# Patient Record
Sex: FEMALE | Race: WHITE | NOT HISPANIC OR LATINO
[De-identification: names, ages, dates, MRNs, and addresses within clinical notes are randomized per-mention and may not be internally consistent; named-entity substitution may affect disease eponyms.]

---

## 2021-01-11 ENCOUNTER — RESULT REVIEW (OUTPATIENT)
Age: 36
End: 2021-01-11

## 2021-01-26 PROBLEM — Z00.00 ENCOUNTER FOR PREVENTIVE HEALTH EXAMINATION: Status: ACTIVE | Noted: 2021-01-26

## 2021-02-22 ENCOUNTER — RESULT REVIEW (OUTPATIENT)
Age: 36
End: 2021-02-22

## 2021-04-27 DIAGNOSIS — Z01.818 ENCOUNTER FOR OTHER PREPROCEDURAL EXAMINATION: ICD-10-CM

## 2021-04-28 ENCOUNTER — APPOINTMENT (OUTPATIENT)
Dept: DISASTER EMERGENCY | Facility: CLINIC | Age: 36
End: 2021-04-28

## 2021-04-29 LAB — SARS-COV-2 N GENE NPH QL NAA+PROBE: NOT DETECTED

## 2021-06-02 ENCOUNTER — APPOINTMENT (OUTPATIENT)
Dept: DISASTER EMERGENCY | Facility: CLINIC | Age: 36
End: 2021-06-02

## 2021-10-19 ENCOUNTER — TRANSCRIPTION ENCOUNTER (OUTPATIENT)
Age: 36
End: 2021-10-19

## 2022-04-25 ENCOUNTER — APPOINTMENT (OUTPATIENT)
Dept: PAIN MANAGEMENT | Facility: CLINIC | Age: 37
End: 2022-04-25
Payer: MEDICAID

## 2022-04-25 VITALS — HEIGHT: 61 IN | WEIGHT: 125 LBS | BODY MASS INDEX: 23.6 KG/M2

## 2022-04-25 DIAGNOSIS — Z87.09 PERSONAL HISTORY OF OTHER DISEASES OF THE RESPIRATORY SYSTEM: ICD-10-CM

## 2022-04-25 PROCEDURE — 99214 OFFICE O/P EST MOD 30 MIN: CPT

## 2022-04-25 NOTE — DISCUSSION/SUMMARY
[de-identified] : After discussing various treatment options with the patient including but not limited to oral medications, physical therapy, exercise modalities as well as interventional spinal injections, we have decided with the following plan:\par \par - Continue Home exercises, stretching, activity modification, physical therapy, and conservative care.\par - Recommend L5-S1 Lumbar Epidural Steroid Injection under fluoroscopic guidance with image.\par - The risks, benefits and alternatives of the proposed procedure were explained in detail with the patient. The risks outlined include but are not limited to infection, bleeding, post-dural puncture headache, nerve injury, a temporary increase in pain, failure to resolve symptoms, allergic reaction, symptom recurrence, and possible elevation of blood sugar in diabetics. All questions were answered to patient's apparent satisfaction and he/she verbalized an understanding.\par - Patient is presenting with acute/sub-acute radicular pain with impairment in ADLs and functionality.  The pain has not responded to conservative care including NSAID therapy and/or physical therapy.  There is no bleeding tendency, unstable medical condition, or systemic infection.\par - Follow up in 1-2 weeks post injection for re-evaluation.\par

## 2022-04-25 NOTE — HISTORY OF PRESENT ILLNESS
[Lower back] : lower back [10] : 10 [0] : 0 [Dull/Aching] : dull/aching [Tightness] : tightness [Constant] : constant [Household chores] : household chores [Work] : work [Injection therapy] : injection therapy [Standing] : standing [Walking] : walking [] : no [FreeTextEntry1] : left

## 2022-04-25 NOTE — PHYSICAL EXAM
[de-identified] : Constitutional; Appears well, no apparent distress\par Ability to communicate: Normal \par Respiratory: non-labored breathing\par Skin: No rash noted\par Head: Normocephalic, atraumatic\par Neck: no visible thyroid enlargement\par Eyes: Extraocular movements intact\par Neurologic: Alert and oriented x3\par Psychiatric: normal mood, affect and behavior \par \par  [Flexion] : flexion [] : non-antalgic

## 2022-05-20 ENCOUNTER — EMERGENCY (EMERGENCY)
Facility: HOSPITAL | Age: 37
LOS: 1 days | Discharge: HOME | End: 2022-05-20
Admitting: EMERGENCY MEDICINE
Payer: MEDICAID

## 2022-05-20 VITALS
HEART RATE: 83 BPM | SYSTOLIC BLOOD PRESSURE: 131 MMHG | OXYGEN SATURATION: 97 % | WEIGHT: 130.07 LBS | TEMPERATURE: 98 F | DIASTOLIC BLOOD PRESSURE: 77 MMHG | RESPIRATION RATE: 18 BRPM

## 2022-05-20 PROCEDURE — 99284 EMERGENCY DEPT VISIT MOD MDM: CPT

## 2022-05-20 NOTE — ED ADULT NURSE NOTE - OBJECTIVE STATEMENT
Patient presents with alcohol intoxication. pt stated she had 3 beer, don't usually drink. Alert and oriented x 3, answering questions appropriately, calm and cooperative

## 2022-05-20 NOTE — ED ADULT NURSE NOTE - NSIMPLEMENTINTERV_GEN_ALL_ED
Implemented All Fall Risk Interventions:  [place] to call system. Call bell, personal items and telephone within reach. Instruct patient to call for assistance. Room bathroom lighting operational. Non-slip footwear when patient is off stretcher. Physically safe environment: no spills, clutter or unnecessary equipment. Stretcher in lowest position, wheels locked, appropriate side rails in place. Provide visual cue, wrist band, yellow gown, etc. Monitor gait and stability. Monitor for mental status changes and reorient to person, place, and time. Review medications for side effects contributing to fall risk. Reinforce activity limits and safety measures with patient and family.

## 2022-05-20 NOTE — ED PROVIDER NOTE - PATIENT PORTAL LINK FT
You can access the FollowMyHealth Patient Portal offered by [facility] by registering at the following website: [email or web address]. By joining [facility]’s FollowMyHealth portal, you will also be able to view your health information using other applications (apps) compatible with our system.

## 2022-05-20 NOTE — ED PROVIDER NOTE - OBJECTIVE STATEMENT
38 yo f bibems for AMS, possibly 2/2 substance use, no trauma reported.  limited history 2/2 mental status.    I have reviewed available current nursing and previous documentation of past medical, surgical, family, and/or social history.

## 2022-05-23 DIAGNOSIS — R41.82 ALTERED MENTAL STATUS, UNSPECIFIED: ICD-10-CM

## 2022-05-23 DIAGNOSIS — F10.929 ALCOHOL USE, UNSPECIFIED WITH INTOXICATION, UNSPECIFIED: ICD-10-CM

## 2022-06-16 ENCOUNTER — APPOINTMENT (OUTPATIENT)
Dept: PAIN MANAGEMENT | Facility: CLINIC | Age: 37
End: 2022-06-16

## 2022-07-11 ENCOUNTER — APPOINTMENT (OUTPATIENT)
Dept: PAIN MANAGEMENT | Facility: CLINIC | Age: 37
End: 2022-07-11

## 2022-07-11 PROCEDURE — 62323 NJX INTERLAMINAR LMBR/SAC: CPT

## 2022-07-11 NOTE — PROCEDURE
[FreeTextEntry3] : Date of Service: 07/11/2022 \par \par Account: [id number]\par \par Patient: FAHAD GAGE \par \par YOB: 1985\par \par Age: 37 year\par \par \par Surgeon:                                                         Luis Felipe Frank D.O.\par \par Pre-Operative Diagnosis:                             Lumbosacral radiculitis\par \par Post-Operative Diagnosis:                           Same\par \par Procedure:                                                      Interlaminar lumbar epidural steroid injection (L5-S1) under fluoroscopic guidance\par \par Anesthesia:                                                     Local with MAC\par \par \par This procedure was carried out using fluoroscopic guidance.  The risks and benefits of the procedure were discussed extensively with the patient.  The consent of the patient was obtained and the following procedure was performed.\par \par The patient was placed in the prone position.  The lumbar area was prepped and draped in a sterile fashion.  A timeout was performed with all essential staff present and the site and side were verified. Under AP view with slight cephalad-caudad angulation, the L5-S1 interspace was identified and marked.  Using sterile technique, the superficial skin was anesthetized with 1% Lidocaine without epinephrine.  A 20-gauge Tuohy needle was advanced into the epidural space under fluoroscopy using cvtmd-[id number]-qavix technique and using loss of resistance at the L5-S1 level.  After negative aspiration for heme or CSF, an epidurogram was obtained using 2-3 cc Omnipaque contrast injected under live fluoroscopy, confirming epidural placement of the needle.  \par \par Epidurogram showed no evidence of intrathecal or intravascular flow, and good evidence of bilateral epidural flow from L3-S2 levels.  After this, 4 cc of preservative free normal saline plus 12 mg of betamethasone were injected into the epidural space.\par \par The needle was subsequently removed.  Anesthesia personnel were present throughout the procedure.\par \par The patient tolerated the procedure well and was instructed to contact me immediately if there were any problems.\par \par Luis Felipe Frank D.O.\par

## 2022-08-15 ENCOUNTER — APPOINTMENT (OUTPATIENT)
Dept: PAIN MANAGEMENT | Facility: CLINIC | Age: 37
End: 2022-08-15

## 2022-08-15 VITALS — HEIGHT: 61 IN | WEIGHT: 135 LBS | BODY MASS INDEX: 25.49 KG/M2

## 2022-08-15 PROCEDURE — 99213 OFFICE O/P EST LOW 20 MIN: CPT

## 2022-08-15 RX ORDER — MELOXICAM 15 MG/1
15 TABLET ORAL
Qty: 30 | Refills: 0 | Status: ACTIVE | COMMUNITY
Start: 2022-08-15 | End: 1900-01-01

## 2022-08-15 NOTE — REASON FOR VISIT
Last seen 10/22/19, no future appointment scheduled.  Per PDMP last dispense Clonazepam 1mg 2/25/19.  
[Follow-Up Visit] : a follow-up pain management visit

## 2022-08-15 NOTE — PHYSICAL EXAM
[Flexion] : flexion [de-identified] : Constitutional; Appears well, no apparent distress\par Ability to communicate: Normal \par Respiratory: non-labored breathing\par Skin: No rash noted\par Head: Normocephalic, atraumatic\par Neck: no visible thyroid enlargement\par Eyes: Extraocular movements intact\par Neurologic: Alert and oriented x3\par Psychiatric: normal mood, affect and behavior \par \par  [] : non-antalgic

## 2022-08-15 NOTE — DISCUSSION/SUMMARY
[de-identified] : After discussing various treatment options with the patient including but not limited to oral medications, physical therapy, exercise modalities as well as interventional spinal injections, we have decided with the following plan:\par \par - Continue home exercises, stretching, activity modification, physical therapy, and conservative care.\par - Follow-up as needed.\par - Will provide prescription for Physical Therapy.\par - Recommend Meloxicam 15mg daily PRN. Potential adverse effects including but not limited to bleeding, ulcers, increased risk of hypertension, heart disease, kidney disease and stroke were discussed with the patient.  Medication would allow patient to be more mobile and perform ADL's.  Will continue to monitor patient and attempt to wean as soon as possible. Will use the lowest dosage possible for the shortest possible period of time.\par

## 2022-08-15 NOTE — HISTORY OF PRESENT ILLNESS
[Lower back] : lower back [Constant] : constant [Household chores] : household chores [Work] : work [Injection therapy] : injection therapy [Standing] : standing [Walking] : walking [0] : 0 [Tightness] : tightness [] : no [FreeTextEntry1] : left

## 2022-11-08 ENCOUNTER — APPOINTMENT (OUTPATIENT)
Dept: PAIN MANAGEMENT | Facility: CLINIC | Age: 37
End: 2022-11-08

## 2022-11-14 ENCOUNTER — APPOINTMENT (OUTPATIENT)
Dept: PAIN MANAGEMENT | Facility: CLINIC | Age: 37
End: 2022-11-14

## 2022-11-14 VITALS — BODY MASS INDEX: 25.49 KG/M2 | WEIGHT: 135 LBS | HEIGHT: 61 IN

## 2022-11-14 PROCEDURE — 99214 OFFICE O/P EST MOD 30 MIN: CPT

## 2022-11-14 NOTE — HISTORY OF PRESENT ILLNESS
[Lower back] : lower back [0] : 0 [Tightness] : tightness [Constant] : constant [Household chores] : household chores [Work] : work [Injection therapy] : injection therapy [Standing] : standing [Walking] : walking [10] : 10 [] : no [FreeTextEntry1] : left

## 2022-11-14 NOTE — DISCUSSION/SUMMARY
[de-identified] : After discussing various treatment options with the patient including but not limited to oral medications, physical therapy, exercise modalities as well as interventional spinal injections, we have decided with the following plan:\par \par - Continue Home exercises, stretching, activity modification, physical therapy, and conservative care.\par - MRI report and/or images was reviewed and discussed with the patient.\par - Recommend L5-S1 Lumbar Epidural Steroid Injection under fluoroscopic guidance with image.\par - The risks, benefits and alternatives of the proposed procedure were explained in detail with the patient. The risks outlined include but are not limited to infection, bleeding, post-dural puncture headache, nerve injury, a temporary increase in pain, failure to resolve symptoms, allergic reaction, symptom recurrence, and possible elevation of blood sugar in diabetics. All questions were answered to patient's apparent satisfaction and he/she verbalized an understanding.\par - Patient is presenting with acute/sub-acute radicular pain with impairment in ADLs and functionality.  The pain has not responded to conservative care including NSAID therapy and/or physical therapy.  There is no bleeding tendency, unstable medical condition, or systemic infection.\par - Follow up in 1-2 weeks post injection for re-evaluation.

## 2022-11-14 NOTE — PHYSICAL EXAM
[Flexion] : flexion [de-identified] : Constitutional; Appears well, no apparent distress\par Ability to communicate: Normal \par Respiratory: non-labored breathing\par Skin: No rash noted\par Head: Normocephalic, atraumatic\par Neck: no visible thyroid enlargement\par Eyes: Extraocular movements intact\par Neurologic: Alert and oriented x3\par Psychiatric: normal mood, affect and behavior \par \par  [] : non-antalgic

## 2022-11-20 ENCOUNTER — TRANSCRIPTION ENCOUNTER (OUTPATIENT)
Age: 37
End: 2022-11-20

## 2022-12-12 ENCOUNTER — APPOINTMENT (OUTPATIENT)
Dept: PAIN MANAGEMENT | Facility: CLINIC | Age: 37
End: 2022-12-12

## 2022-12-12 PROCEDURE — 62323 NJX INTERLAMINAR LMBR/SAC: CPT

## 2022-12-12 NOTE — PROCEDURE
[FreeTextEntry3] : Date of Service: 12/12/2022 \par \par Account: [id number]\par \par Patient: FAHAD GAGE \par \par YOB: 1985\par \par Age: 37 year\par \par \par Surgeon:                                                         Luis Felipe Frank D.O.\par \par Pre-Operative Diagnosis:                             Lumbosacral radiculitis\par \par Post-Operative Diagnosis:                           Same\par \par Procedure:                                                      Interlaminar lumbar epidural steroid injection (L5-S1) under fluoroscopic guidance\par \par Anesthesia:                                                     Local with MAC\par \par \par This procedure was carried out using fluoroscopic guidance.  The risks and benefits of the procedure were discussed extensively with the patient.  The consent of the patient was obtained and the following procedure was performed.\par \par The patient was placed in the prone position.  The lumbar area was prepped and draped in a sterile fashion.  A timeout was performed with all essential staff present and the site and side were verified. Under AP view with slight cephalad-caudad angulation, the L5-S1 interspace was identified and marked.  Using sterile technique, the superficial skin was anesthetized with 1% Lidocaine without epinephrine.  A 20-gauge Tuohy needle was advanced into the epidural space under fluoroscopy using vbiki-zamzyzqlg-xdoit technique and using loss of resistance at the L5-S1 level.  After negative aspiration for heme or CSF, an epidurogram was obtained using 2-3 cc Omnipaque contrast injected under live fluoroscopy, confirming epidural placement of the needle.  \par \par Epidurogram showed no evidence of intrathecal or intravascular flow, and good evidence of bilateral epidural flow from L3-S2 levels.  After this, 4 cc of preservative free normal saline plus 12 mg of betamethasone were injected into the epidural space.\par \par The needle was subsequently removed.  Anesthesia personnel were present throughout the procedure.\par \par The patient tolerated the procedure well and was instructed to contact me immediately if there were any problems.\par \par Luis Felipe Frank D.O.\par

## 2022-12-27 ENCOUNTER — APPOINTMENT (OUTPATIENT)
Dept: PAIN MANAGEMENT | Facility: CLINIC | Age: 37
End: 2022-12-27

## 2022-12-27 VITALS — BODY MASS INDEX: 24.55 KG/M2 | WEIGHT: 130 LBS | HEIGHT: 61 IN

## 2022-12-27 PROCEDURE — 99213 OFFICE O/P EST LOW 20 MIN: CPT

## 2022-12-27 RX ORDER — CYCLOBENZAPRINE HYDROCHLORIDE 10 MG/1
10 TABLET, FILM COATED ORAL TWICE DAILY
Qty: 60 | Refills: 0 | Status: ACTIVE | COMMUNITY
Start: 2022-04-25 | End: 1900-01-01

## 2022-12-27 NOTE — PHYSICAL EXAM
[Flexion] : flexion [de-identified] : Constitutional; Appears well, no apparent distress\par Ability to communicate: Normal \par Respiratory: non-labored breathing\par Skin: No rash noted\par Head: Normocephalic, atraumatic\par Neck: no visible thyroid enlargement\par Eyes: Extraocular movements intact\par Neurologic: Alert and oriented x3\par Psychiatric: normal mood, affect and behavior \par \par  [] : non-antalgic

## 2022-12-27 NOTE — HISTORY OF PRESENT ILLNESS
[Lower back] : lower back [0] : 0 [Tightness] : tightness [Household chores] : household chores [Work] : work [Injection therapy] : injection therapy [Standing] : standing [Walking] : walking [6] : 6 [Intermittent] : intermittent [] : no [FreeTextEntry1] : left  [de-identified] : L MRI

## 2022-12-30 ENCOUNTER — RESULT REVIEW (OUTPATIENT)
Age: 37
End: 2022-12-30

## 2023-01-06 ENCOUNTER — APPOINTMENT (OUTPATIENT)
Dept: ORTHOPEDIC SURGERY | Facility: CLINIC | Age: 38
End: 2023-01-06

## 2023-01-12 ENCOUNTER — APPOINTMENT (OUTPATIENT)
Dept: PAIN MANAGEMENT | Facility: CLINIC | Age: 38
End: 2023-01-12
Payer: MEDICAID

## 2023-01-12 VITALS — HEIGHT: 61 IN | BODY MASS INDEX: 24.55 KG/M2 | WEIGHT: 130 LBS

## 2023-01-12 PROCEDURE — 99213 OFFICE O/P EST LOW 20 MIN: CPT

## 2023-01-12 NOTE — HISTORY OF PRESENT ILLNESS
[Lower back] : lower back [0] : 0 [Tightness] : tightness [Intermittent] : intermittent [Household chores] : household chores [Work] : work [Injection therapy] : injection therapy [Standing] : standing [Walking] : walking [7] : 7 [Dull/Aching] : dull/aching [] : no [FreeTextEntry1] : left  [de-identified] : L MRI

## 2023-01-12 NOTE — DISCUSSION/SUMMARY
[de-identified] : After discussing various treatment options with the patient including but not limited to oral medications, physical therapy, exercise modalities as well as interventional spinal injections, we have decided with the following plan:\par \par - Continue home exercises, stretching, activity modification, physical therapy, and conservative care.\par - MRI report and/or images was reviewed and discussed with the patient.\par - Follow-up as needed.\par - Recommend continue Cyclobenzaprine 10mg BID PRN for muscle spasms and to assist with pain relief.\par \par

## 2023-01-12 NOTE — PHYSICAL EXAM
[Flexion] : flexion [de-identified] : Constitutional; Appears well, no apparent distress\par Ability to communicate: Normal \par Respiratory: non-labored breathing\par Skin: No rash noted\par Head: Normocephalic, atraumatic\par Neck: no visible thyroid enlargement\par Eyes: Extraocular movements intact\par Neurologic: Alert and oriented x3\par Psychiatric: normal mood, affect and behavior \par \par  [] : non-antalgic

## 2023-01-24 ENCOUNTER — APPOINTMENT (OUTPATIENT)
Dept: PAIN MANAGEMENT | Facility: CLINIC | Age: 38
End: 2023-01-24

## 2023-08-22 ENCOUNTER — APPOINTMENT (OUTPATIENT)
Dept: PAIN MANAGEMENT | Facility: CLINIC | Age: 38
End: 2023-08-22

## 2024-01-18 ENCOUNTER — APPOINTMENT (OUTPATIENT)
Dept: PAIN MANAGEMENT | Facility: CLINIC | Age: 39
End: 2024-01-18
Payer: MEDICAID

## 2024-01-18 VITALS — WEIGHT: 132 LBS | BODY MASS INDEX: 24.92 KG/M2 | HEIGHT: 61 IN

## 2024-01-18 PROCEDURE — 99214 OFFICE O/P EST MOD 30 MIN: CPT

## 2024-01-18 NOTE — HISTORY OF PRESENT ILLNESS
[Lower back] : lower back [7] : 7 [0] : 0 [Dull/Aching] : dull/aching [Tightness] : tightness [Intermittent] : intermittent [Household chores] : household chores [Work] : work [Injection therapy] : injection therapy [Standing] : standing [Walking] : walking [] : no [FreeTextEntry1] : left  [de-identified] : L MRI

## 2024-01-18 NOTE — PHYSICAL EXAM
[Flexion] : flexion [de-identified] : Constitutional; Appears well, no apparent distress\par  Ability to communicate: Normal \par  Respiratory: non-labored breathing\par  Skin: No rash noted\par  Head: Normocephalic, atraumatic\par  Neck: no visible thyroid enlargement\par  Eyes: Extraocular movements intact\par  Neurologic: Alert and oriented x3\par  Psychiatric: normal mood, affect and behavior \par  \par   [Extension] : extension [] : non-antalgic

## 2024-01-18 NOTE — DISCUSSION/SUMMARY
[de-identified] : After discussing various treatment options with the patient including but not limited to oral medications, physical therapy, exercise modalities as well as interventional spinal injections, we have decided with the following plan:  - Continue Home exercises, stretching, activity modification, physical therapy, and conservative care. - MRI report and/or images was reviewed and discussed with the patient. - Recommend First and Second Diagnostic Bilateral L3,L4,L5 Medial Branch Blocks under fluoroscopic guidance with image. - Patient presents with axial lumbar pain that has not responded to 3 months of conservative therapy including physical therapy or NSAID therapy.  The pain is interfering with activities of daily living and functionality. There is no radicular pain. The pain is exacerbated by facet loading / positive Kemps maneuver which is defined by pain reproduction with extension and rotation of the lumbar spine to the affected side.  The patient has not had a vertebral fusion at the levels of the proposed treatment.  There is no unexplained neurologic deficit.  There is no history of systemic infection, unstable medical condition, bleeding tendency, or local infection.  The injection is being performed to diagnose the facet joint as the source of the individual's pain, in preparation for a radiofrequency ablation.  - The risks, benefits, contents and alternatives to injection were explained in full to the patient.  Risks outlined include but are not limited to infection, sepsis, bleeding, post-dural puncture headache, nerve damage, temporary increase in pain, syncopal episode, failure to resolve symptoms, allergic reaction, symptom recurrence, and elevation of blood sugar in diabetics. Cortisone may cause immunosuppression.  Patient understands the risks.  All questions were answered.  After discussion of options, patient requested an injection.  Information regarding the injection was given to the patient.  Which medications to stop prior to the injection was explained to the patient as well. - Follow up in 1-2 weeks post injection for re-evaluation.

## 2024-02-13 ENCOUNTER — APPOINTMENT (OUTPATIENT)
Dept: PAIN MANAGEMENT | Facility: CLINIC | Age: 39
End: 2024-02-13

## 2024-02-20 ENCOUNTER — APPOINTMENT (OUTPATIENT)
Dept: PAIN MANAGEMENT | Facility: CLINIC | Age: 39
End: 2024-02-20

## 2024-02-27 ENCOUNTER — APPOINTMENT (OUTPATIENT)
Dept: PAIN MANAGEMENT | Facility: CLINIC | Age: 39
End: 2024-02-27
Payer: MEDICAID

## 2024-02-27 PROCEDURE — 64493 INJ PARAVERT F JNT L/S 1 LEV: CPT | Mod: 50

## 2024-02-27 PROCEDURE — 64494 INJ PARAVERT F JNT L/S 2 LEV: CPT | Mod: 50,59

## 2024-02-27 PROCEDURE — J3490M: CUSTOM

## 2024-02-27 NOTE — PROCEDURE
[FreeTextEntry3] : Date of Service: 02/27/2024   Account: [id number]  Patient: FAHAD GAGE   YOB: 1985  Age: 38 year   Surgeon:                                                        Luis Felipe Frank D.O.   Assistant:                                                        None  Pre-Operative Diagnosis:                            Spondylosis of lumbar region without myelopathy or radiculopathy (M47.816)  Post-Operative Diagnosis:                          Same  Procedure:                                                     Right L3, L4, L5 medial branch block                                                                           Left L3, L4, L5 medial branch block under fluoroscopic guidance  Anesthesia:                                                     Local with MAC   This procedure was carried out using fluoroscopic guidance.  The risks and benefits of the procedure were discussed extensively with the patient.  The consent of the patient was obtained and the following procedure was performed.  The patient was placed in the prone position.  The patient's back was prepped and draped in a sterile fashion. A timeout was performed with all essential staff present and the site and side were verified. The L4 and L5 lumbar vertebral bodies were identified and the fluoroscope left obliqued to approximately 30 degrees to reveal good "merissa-dog" anatomical view.  The junction of the superior articulate process and transverse process at the L4 and L5 and sacral ala level was then identified and marked. The skin at these target points was then localized using 1 cc of 1% lidocaine without epinephrine at each injection site.  A spinal needle was then introduced and advanced to the above target points at the junction of the SAP and transverse processes and sacral ala until bone was contacted.  After negative aspiration for heme and CSF, an injectate of 1.5 cc 0.25% Bupivacaine plus 1 mg of betamethasone was injected at each of the three levels.   The L4 and L5 lumbar vertebral bodies were identified and the fluoroscope right obliqued to approximately 30 degrees to reveal good "merissa-dog" anatomical view.  The junction of the superior articulate process and transverse process at the L4 and L5 and sacral ala level was then identified and marked. The skin at these target points was then localized using 1 cc of 1% lidocaine without epinephrine at each injection site.  A spinal needle was then introduced and advanced to the above target points at the junction of the SAP and transverse processes and sacral ala until bone was contacted.  After negative aspiration for heme and CSF, an injectate of 1.5 cc 0.25% bupivacaine plus 1 mg of betamethasone was injected at each of the three levels.   The needles were then removed and pressure was applied.  Anesthesia personnel were present throughout the procedure, monitoring vitals which were stable throughout.  Luis Felipe Frank D.O.

## 2024-03-07 ENCOUNTER — APPOINTMENT (OUTPATIENT)
Dept: PAIN MANAGEMENT | Facility: CLINIC | Age: 39
End: 2024-03-07
Payer: MEDICAID

## 2024-03-07 PROCEDURE — 64494 INJ PARAVERT F JNT L/S 2 LEV: CPT | Mod: 50,59

## 2024-03-07 PROCEDURE — J3490M: CUSTOM

## 2024-03-07 PROCEDURE — 64493 INJ PARAVERT F JNT L/S 1 LEV: CPT | Mod: 50

## 2024-03-07 NOTE — PROCEDURE
[FreeTextEntry3] : Date of Service: 03/07/2024   Account: [id number]  Patient: FAHAD GAGE   YOB: 1985  Age: 38 year   Surgeon:                                                        Luis Felipe Frank D.O.   Assistant:                                                        None  Pre-Operative Diagnosis:                            Spondylosis of lumbar region without myelopathy or radiculopathy (M47.816)  Post-Operative Diagnosis:                          Same  Procedure:                                                     Right L3, L4, L5 medial branch block                                                                           Left L3, L4, L5 medial branch block under fluoroscopic guidance  Anesthesia:                                                     Local with MAC   This procedure was carried out using fluoroscopic guidance.  The risks and benefits of the procedure were discussed extensively with the patient.  The consent of the patient was obtained and the following procedure was performed.  The patient was placed in the prone position.  The patient's back was prepped and draped in a sterile fashion. A timeout was performed with all essential staff present and the site and side were verified. The L4 and L5 lumbar vertebral bodies were identified and the fluoroscope left obliqued to approximately 30 degrees to reveal good "merissa-dog" anatomical view.  The junction of the superior articulate process and transverse process at the L4 and L5 and sacral ala level was then identified and marked. The skin at these target points was then localized using 1 cc of 1% lidocaine without epinephrine at each injection site.  A spinal needle was then introduced and advanced to the above target points at the junction of the SAP and transverse processes and sacral ala until bone was contacted.  After negative aspiration for heme and CSF, an injectate of 1.5 cc 0.25% Bupivacaine plus 1 mg of betamethasone was injected at each of the three levels.   The L4 and L5 lumbar vertebral bodies were identified and the fluoroscope right obliqued to approximately 30 degrees to reveal good "merissa-dog" anatomical view.  The junction of the superior articulate process and transverse process at the L4 and L5 and sacral ala level was then identified and marked. The skin at these target points was then localized using 1 cc of 1% lidocaine without epinephrine at each injection site.  A spinal needle was then introduced and advanced to the above target points at the junction of the SAP and transverse processes and sacral ala until bone was contacted.  After negative aspiration for heme and CSF, an injectate of 1.5 cc 0.25% bupivacaine plus 1 mg of betamethasone was injected at each of the three levels.   The needles were then removed and pressure was applied.  Anesthesia personnel were present throughout the procedure, monitoring vitals which were stable throughout.  Luis Felipe Frank D.O.

## 2024-03-14 ENCOUNTER — APPOINTMENT (OUTPATIENT)
Dept: PAIN MANAGEMENT | Facility: CLINIC | Age: 39
End: 2024-03-14
Payer: MEDICAID

## 2024-03-14 VITALS — HEIGHT: 61 IN | WEIGHT: 132 LBS | BODY MASS INDEX: 24.92 KG/M2

## 2024-03-14 PROCEDURE — 99214 OFFICE O/P EST MOD 30 MIN: CPT

## 2024-03-14 NOTE — PHYSICAL EXAM
[de-identified] : Constitutional; Appears well, no apparent distress Ability to communicate: Normal  Respiratory: non-labored breathing Skin: No rash noted Head: Normocephalic, atraumatic Neck: no visible thyroid enlargement Eyes: Extraocular movements intact Neurologic: Alert and oriented x3 Psychiatric: normal mood, affect and behavior [] : positive Smallwood maneuver facet loading

## 2024-03-14 NOTE — HISTORY OF PRESENT ILLNESS
[Lower back] : lower back [7] : 7 [Dull/Aching] : dull/aching [Tightness] : tightness [Intermittent] : intermittent [Household chores] : household chores [Work] : work [Injection therapy] : injection therapy [Walking] : walking [Standing] : standing [2] : 2 [] : no [FreeTextEntry1] : 03/14/2024: s/p first and second diagnostic B/L L3,4,5 medial branch blocks on 2/27/24 and 3/7/24 with >80% relief and improvement of ADLs. Has been feeling much better since injection.   01/18/2024: Pain is on the b/l lower back described as stuff/achy pain.   01/12/2023: pt here to go over L MRI   L-spine MRI 12/30/22 independently reviewed: no changes since last MRI   12/27/22:  s/p L5-S1 LESI on 12/12/2022 with 60% relief and improvement of ADLs. Still has some pain on the left lower back but much improved from prior to the injection. Has been doing PT.   11/14/2022: Pain started again about a month ago after lifting a heavy object at work. Pain is on the left lower back pain with no radiation down the legs. No longer has been taking gabapentin.   08/15/2022: s/p L5-S1 LESI on 7/11/22 with 70% relief and improvement of ADLs.   4/25/22: pt has pain left lower back - no radiation down the leg. Had TPI at last visit with temporary relief.   3/14/22: s/p L5-S1 LESI on 2/24/22 with 50% relief and improvement of ADLs. Pain is on the left side of the lower back but less severe than prior to the injection.   2/15/22: Pain has now returned on the left side of the lower back - no radiation down the legs. Continues to takes gabapentin.   6.21.21: s/p L5-S1 LESI on 6.7.21 with 70% relief of pain and improvement of ADLs. Pain is now less severe than prior to the injection.   5.20.21: s/p L5-S1 LESI on 5.3.21 with 60% relief of pain and improvement of ADLs. Continues to take gabapentin.   Initial HPI: Pain started in Sept 2020 and is on the left side of the lower back described as a tight aching pain. No radiation down the legs. Has done PT with little relief and tried Tylenol/NSAIDS and taking Gabapentin 600mg TID from previous pain physician (Dr. Clements). Saw an ortho who recommended trial of FRANKI.

## 2024-03-14 NOTE — DISCUSSION/SUMMARY
[de-identified] : After discussing various treatment options with the patient including but not limited to oral medications, physical therapy, exercise modalities as well as interventional spinal injections, we have decided with the following plan:   - Continue Home exercises, stretching, activity modification, physical therapy, and conservative care. - MRI report and/or images was reviewed and discussed with the patient. - Recommend Bilateral L3,4,5 radiofrequency ablation under fluoroscopic guidance with image. - Patient has lumbosacral axial pain that is consistent with facet joint pathology. Two diagnostic blocks of the medial branch nerves with local anesthetic was performed and has resulted in at least an 80% reduction in pain for the duration of the specific local anesthetic. The pain is not radicular. There is no prior spinal fusion surgery at the level targeted.  The pain has failed to respond to three months of conservative therapy. - The risks, benefits, contents and alternatives to injection were explained in full to the patient.  Risks outlined include but are not limited to infection, sepsis, bleeding, post-dural puncture headache, nerve damage, temporary increase in pain, syncopal episode, failure to resolve symptoms, allergic reaction, symptom recurrence, and elevation of blood sugar in diabetics. Cortisone may cause immunosuppression.  Patient understands the risks.  All questions were answered.  After discussion of options, patient requested an injection.  Information regarding the injection was given to the patient.  Which medications to stop prior to the injection was explained to the patient as well. - Follow up in 1-2 weeks post injection for re-evaluation.

## 2024-03-27 ENCOUNTER — APPOINTMENT (OUTPATIENT)
Dept: ORTHOPEDIC SURGERY | Facility: CLINIC | Age: 39
End: 2024-03-27
Payer: MEDICAID

## 2024-03-27 VITALS — HEIGHT: 61 IN | BODY MASS INDEX: 24.55 KG/M2 | WEIGHT: 130 LBS

## 2024-03-27 DIAGNOSIS — M17.11 UNILATERAL PRIMARY OSTEOARTHRITIS, RIGHT KNEE: ICD-10-CM

## 2024-03-27 DIAGNOSIS — M25.561 PAIN IN RIGHT KNEE: ICD-10-CM

## 2024-03-27 PROCEDURE — 99214 OFFICE O/P EST MOD 30 MIN: CPT

## 2024-03-27 PROCEDURE — 73564 X-RAY EXAM KNEE 4 OR MORE: CPT | Mod: RT

## 2024-03-27 NOTE — HISTORY OF PRESENT ILLNESS
[de-identified] : 3/27/24: pt is here today for rt knee pain. Did PT for the knee with good relief. Has had gel and CSI in the past with no relief. States PT has helped.  Had arthroscopic surgery in 2020

## 2024-03-27 NOTE — ASSESSMENT
[FreeTextEntry1] : 39f P/W  R knee OA  Begin PT Continue NSAIDs for pain return as needed   The natural progression of Osteoarthritis was explained to the patient. We discussed the possible treatment options from conservative to operative. These included NSAIDS, Glucosamine and Chondrotin sulfate, and Physical Therapy as well different types of injections. We also discussed that at some point they may progress to needed a TKA. Information and pamphlets were given.

## 2024-03-27 NOTE — DISCUSSION/SUMMARY
[de-identified] : The patient's current medication management of their orthopedic diagnosis was reviewed today:   (1) We discussed a comprehensive treatment plan that included possible pharmaceutical management involving the use of prescription strength medications including but not limited to options such as oral Naprosyn 500mg BID, once daily Meloxicam 15 mg, or 500-650 mg Tylenol versus over the counter oral medications and topical prescription NSAID Pennsaid vs over the counter Voltaren gel.   (2) There is a moderate risk of morbidity with further treatment, especially from use of prescription strength medications and possible side effects of these medications which include upset stomach with oral medications, skin reactions to topical medications and cardiac/renal issues with long term use.   (3) I recommended that the patient follow-up with their medical physician to discuss any significant specific potential issues with long term medication use such as interactions with current medications or with exacerbation of underlying medical comorbidities.   (4) The benefits and risks associated with use of injectable, oral or topical, prescription and over the counter anti-inflammatory medications were discussed with the patient. The patient voiced understanding of the risks including but not limited to bleeding, stroke, kidney dysfunction, heart disease, and were referred to the black box warning label for further information.  Prior to appointment and during encounter with patient extensive medical records were reviewed including but not limited to, hospital records, out patient records, imaging results, and lab data. During this appointment the patient was examined, diagnoses were discussed and explained in a face to face manner. In addition extensive time was spent reviewing aforementioned diagnostic studies. Counseling including abnormal image results, differential diagnoses, treatment options, risk and benefits, lifestyle changes, current condition, and current medications was performed. Patient's comments, questions, and concerns were address and patient verbalized understanding.

## 2024-03-27 NOTE — PHYSICAL EXAM
[Right] : right knee [NL (140)] : flexion 140 degrees [NL (0)] : extension 0 degrees [] : no medial joint line tenderness

## 2024-03-28 ENCOUNTER — APPOINTMENT (OUTPATIENT)
Dept: PAIN MANAGEMENT | Facility: CLINIC | Age: 39
End: 2024-03-28
Payer: MEDICAID

## 2024-03-28 PROCEDURE — J3490M: CUSTOM

## 2024-03-28 PROCEDURE — 64636Z: CUSTOM | Mod: 50,59

## 2024-03-28 PROCEDURE — 64635 DESTROY LUMB/SAC FACET JNT: CPT | Mod: 50

## 2024-03-28 NOTE — PROCEDURE
[FreeTextEntry3] : Date of Service: 03/28/2024   Account: [id number]  Patient: FAHAD GAGE   YOB: 1985  Age: 39 year   Surgeon: Luis Felipe Frank D.O.   PREOPERATIVE DIAGNOSIS: Spondylosis of Lumbar Region without Myelopathy or Radiculopathy (M47.816)  POSTOPERATIVE DIAGNOSIS: Spondylosis of Lumbar Region without Myelopathy or Radiculopathy (M47.816)  PROCEDURE:  Right L3, L4, L5 and Left L3, L4, L5 medial branch radiofrequency ablation under fluoroscopic guidance.              Anesthesia: Local with MAC   Risks, benefits and alternatives of the procedure were discussed with the patient after which she agreed to proceed.  Patient was brought into fluoroscopy suite and was placed in prone position with hip support. Back was prepped and draped in a sterile fashion. Anesthesia initiated.   Under AP visualization, the right and left sacral ala was identified and marked. Using a 25-gauge  inch needle the skin and subcutaneous structures at this point were localized with 1% Lidocaine using approximately 3 cc's 1% Lidocaine.  After this, a 20-gauge 100mm [place] radiofrequency needle with a 10mm curved tip was inserted and using depth direction depth technique under constant fluoroscopic visualization, the needle was advanced to the sacral ala until os was contacted.    The camera was then redirected under AP view to visualize the right and left L4 and L5 vertebra. The camera was obliqued to approximately 30 degrees to reveal good Shimon dog anatomical view. The junction of the superior articulate process and transverse process at the L4 and L5 levels were then identified and marked. Skin and subcutaneous structures were then anesthetized with approximately 3 cc's of 1% Lidocaine at each of these levels. After which a 20-gauge 100mm [place] radiofrequency needle with a 10mm curved tip then advanced until the junction at the SAP and transverse process was met.  The camera was then directed through the lateral view and under constant fluoroscopic visualization, the needle tips were then advanced and confirmed at the junction of the SAP and transverse process.    The stylette for the most cephalad needle at the L4 level was then removed and a [place] radiofrequency probe was then placed inside the needle. After her pedis' were checked at approximately 300 ohm, 50 hertz sensory stimulation was performed.  Patient experienced concordant pain in his low back at approximately 0.3 volts. The voltage was then increased to 1 volt. The patient reported increased low back pain symptoms without any radiation below the knee.  Stimulation was then changed to 2 hertz and increased slowly by .1 volt increments at approximately 0.5 volts, patient began to experience thumping like reproductive pain in his low back. The voltage was then increased to approximately 2.5 volts. Patient experienced increasing thumping without any sensation below his knee. This exact stimulation was then repeated for the L5 needle as well as sacral ala needle with concordant pain and no radiation below the knee. The 6 levels were then anesthetized with approximately 0.5 cc's of 1% Lidocaine. After which each area was then ablated at 80 degrees centigrade for 60 seconds each. Patient felt no pain reproduction during the ablation procedure.  After each of these levels were ablated and injected approximately 1 cc of 0.25% Bupivacaine plus 1.5mg of betamethasone were then injected before the needles were removed.  Pressure was then applied to the low back. Band-aids were applied.  Patient was brought to the recovery, ambulated on his own after the procedure and reported decreased low back pain.   Anesthesia personnel were present throughout the procedure. Patient was told to apply ice to the low back for 20 minutes on and 20 minutes off for focal symptoms for 24-48 hours. He is to call the office if he had any questions or concerns.    Luis Felipe Frank D.O.

## 2024-04-11 ENCOUNTER — APPOINTMENT (OUTPATIENT)
Dept: PAIN MANAGEMENT | Facility: CLINIC | Age: 39
End: 2024-04-11
Payer: MEDICAID

## 2024-04-11 VITALS — WEIGHT: 135 LBS | HEIGHT: 61 IN | BODY MASS INDEX: 25.49 KG/M2

## 2024-04-11 DIAGNOSIS — M54.17 RADICULOPATHY, LUMBOSACRAL REGION: ICD-10-CM

## 2024-04-11 DIAGNOSIS — M47.816 SPONDYLOSIS W/OUT MYELOPATHY OR RADICULOPATHY, LUMBAR REGION: ICD-10-CM

## 2024-04-11 DIAGNOSIS — M54.50 LOW BACK PAIN, UNSPECIFIED: ICD-10-CM

## 2024-04-11 PROCEDURE — 99213 OFFICE O/P EST LOW 20 MIN: CPT

## 2024-04-11 NOTE — DISCUSSION/SUMMARY
[de-identified] : After discussing various treatment options with the patient including but not limited to oral medications, physical therapy, exercise modalities as well as interventional spinal injections, we have decided with the following plan:  - Continue home exercises, stretching, activity modification, physical therapy, and conservative care. - Follow-up as needed. - Will provide prescription for Physical Therapy.

## 2024-04-11 NOTE — PHYSICAL EXAM
[de-identified] : Constitutional; Appears well, no apparent distress Ability to communicate: Normal  Respiratory: non-labored breathing Skin: No rash noted Head: Normocephalic, atraumatic Neck: no visible thyroid enlargement Eyes: Extraocular movements intact Neurologic: Alert and oriented x3 Psychiatric: normal mood, affect and behavior [] : no ecchymosis

## 2024-06-20 ENCOUNTER — APPOINTMENT (OUTPATIENT)
Dept: PAIN MANAGEMENT | Facility: CLINIC | Age: 39
End: 2024-06-20

## 2024-06-26 ENCOUNTER — APPOINTMENT (OUTPATIENT)
Dept: ORTHOPEDIC SURGERY | Facility: CLINIC | Age: 39
End: 2024-06-26

## 2024-08-01 NOTE — HISTORY OF PRESENT ILLNESS
Spoke with patient. She's concerned about new numbness and tingling in both of her feet and ankles.   Started about two weeks ago.   This happens intermittently, and nothing seems to help or make it worse.  She does take gabapentin at night, and is not having trouble sleeping.   The numbness is worse in the morning when she wakes up, then \"goes away a little\"  Aware that Dr. Webster is out of office today, will forward to Dr. Sanchez for recommendations.   [Lower back] : lower back [7] : 7 [2] : 2 [Dull/Aching] : dull/aching [Tightness] : tightness [Intermittent] : intermittent [Household chores] : household chores [Work] : work [Injection therapy] : injection therapy [Standing] : standing [Walking] : walking [] : no [FreeTextEntry1] : left

## 2024-08-07 ENCOUNTER — APPOINTMENT (OUTPATIENT)
Dept: ORTHOPEDIC SURGERY | Facility: CLINIC | Age: 39
End: 2024-08-07

## 2024-08-12 ENCOUNTER — APPOINTMENT (OUTPATIENT)
Dept: PAIN MANAGEMENT | Facility: CLINIC | Age: 39
End: 2024-08-12
Payer: MEDICAID

## 2024-08-12 VITALS — WEIGHT: 135 LBS | HEIGHT: 61 IN | BODY MASS INDEX: 25.49 KG/M2

## 2024-08-12 DIAGNOSIS — M47.816 SPONDYLOSIS W/OUT MYELOPATHY OR RADICULOPATHY, LUMBAR REGION: ICD-10-CM

## 2024-08-12 DIAGNOSIS — M54.50 LOW BACK PAIN, UNSPECIFIED: ICD-10-CM

## 2024-08-12 PROCEDURE — 99214 OFFICE O/P EST MOD 30 MIN: CPT

## 2024-08-12 NOTE — PHYSICAL EXAM
[de-identified] : Constitutional; Appears well, no apparent distress Ability to communicate: Normal  Respiratory: non-labored breathing Skin: No rash noted Head: Normocephalic, atraumatic Neck: no visible thyroid enlargement Eyes: Extraocular movements intact Neurologic: Alert and oriented x3 Psychiatric: normal mood, affect and behavior [] : no ecchymosis

## 2024-08-12 NOTE — HISTORY OF PRESENT ILLNESS
[Lower back] : lower back [7] : 7 [2] : 2 [Dull/Aching] : dull/aching [Tightness] : tightness [Intermittent] : intermittent [Household chores] : household chores [Work] : work [Injection therapy] : injection therapy [Standing] : standing [Walking] : walking [] : no [FreeTextEntry1] : left

## 2024-08-12 NOTE — DISCUSSION/SUMMARY
[de-identified] : After discussing various treatment options with the patient including but not limited to oral medications, physical therapy, exercise modalities as well as interventional spinal injections, we have decided with the following plan:   - Continue Home exercises, stretching, activity modification, physical therapy, and conservative care. - MRI report and/or images was reviewed and discussed with the patient. - Recommend Bilateral L3,4,5 radiofrequency ablation under fluoroscopic guidance with image. - Patient has lumbosacral axial pain that is consistent with facet joint pathology. Two diagnostic blocks of the medial branch nerves with local anesthetic was performed and has resulted in at least an 80% reduction in pain for the duration of the specific local anesthetic. The pain is not radicular. There is no prior spinal fusion surgery at the level targeted.  The pain has failed to respond to three months of conservative therapy. - The risks, benefits, contents and alternatives to injection were explained in full to the patient.  Risks outlined include but are not limited to infection, sepsis, bleeding, post-dural puncture headache, nerve damage, temporary increase in pain, syncopal episode, failure to resolve symptoms, allergic reaction, symptom recurrence, and elevation of blood sugar in diabetics. Cortisone may cause immunosuppression.  Patient understands the risks.  All questions were answered.  After discussion of options, patient requested an injection.  Information regarding the injection was given to the patient.  Which medications to stop prior to the injection was explained to the patient as well. - Follow up in 1-2 weeks post injection for re-evaluation. - Will provide prescription for Physical Therapy.  - Recommend Cyclobenzaprine 10mg BID PRN for muscle spasms and to assist with pain relief. Will refill.  - Recommend Meloxicam 15mg daily PRN. Potential adverse effects including but not limited to bleeding, ulcers, increased risk of hypertension, heart disease, kidney disease and stroke were discussed with the patient.  Medication would allow patient to be more mobile and perform ADL's.  Will continue to monitor patient and attempt to wean as soon as possible. Will use the lowest dosage possible for the shortest possible period of time. Will refill.

## 2024-08-12 NOTE — DISCUSSION/SUMMARY
[de-identified] : After discussing various treatment options with the patient including but not limited to oral medications, physical therapy, exercise modalities as well as interventional spinal injections, we have decided with the following plan:   - Continue Home exercises, stretching, activity modification, physical therapy, and conservative care. - MRI report and/or images was reviewed and discussed with the patient. - Recommend Bilateral L3,4,5 radiofrequency ablation under fluoroscopic guidance with image. - Patient has lumbosacral axial pain that is consistent with facet joint pathology. Two diagnostic blocks of the medial branch nerves with local anesthetic was performed and has resulted in at least an 80% reduction in pain for the duration of the specific local anesthetic. The pain is not radicular. There is no prior spinal fusion surgery at the level targeted.  The pain has failed to respond to three months of conservative therapy. - The risks, benefits, contents and alternatives to injection were explained in full to the patient.  Risks outlined include but are not limited to infection, sepsis, bleeding, post-dural puncture headache, nerve damage, temporary increase in pain, syncopal episode, failure to resolve symptoms, allergic reaction, symptom recurrence, and elevation of blood sugar in diabetics. Cortisone may cause immunosuppression.  Patient understands the risks.  All questions were answered.  After discussion of options, patient requested an injection.  Information regarding the injection was given to the patient.  Which medications to stop prior to the injection was explained to the patient as well. - Follow up in 1-2 weeks post injection for re-evaluation. - Will provide prescription for Physical Therapy.  - Recommend Cyclobenzaprine 10mg BID PRN for muscle spasms and to assist with pain relief. Will refill.  - Recommend Meloxicam 15mg daily PRN. Potential adverse effects including but not limited to bleeding, ulcers, increased risk of hypertension, heart disease, kidney disease and stroke were discussed with the patient.  Medication would allow patient to be more mobile and perform ADL's.  Will continue to monitor patient and attempt to wean as soon as possible. Will use the lowest dosage possible for the shortest possible period of time. Will refill.

## 2024-08-12 NOTE — PHYSICAL EXAM
[de-identified] : Constitutional; Appears well, no apparent distress Ability to communicate: Normal  Respiratory: non-labored breathing Skin: No rash noted Head: Normocephalic, atraumatic Neck: no visible thyroid enlargement Eyes: Extraocular movements intact Neurologic: Alert and oriented x3 Psychiatric: normal mood, affect and behavior [] : no ecchymosis

## 2024-08-21 ENCOUNTER — APPOINTMENT (OUTPATIENT)
Dept: ORTHOPEDIC SURGERY | Facility: CLINIC | Age: 39
End: 2024-08-21
Payer: MEDICAID

## 2024-08-21 VITALS — BODY MASS INDEX: 26.06 KG/M2 | WEIGHT: 138 LBS | HEIGHT: 61 IN

## 2024-08-21 DIAGNOSIS — M17.11 UNILATERAL PRIMARY OSTEOARTHRITIS, RIGHT KNEE: ICD-10-CM

## 2024-08-21 PROCEDURE — 99214 OFFICE O/P EST MOD 30 MIN: CPT | Mod: 25

## 2024-08-21 PROCEDURE — 20610 DRAIN/INJ JOINT/BURSA W/O US: CPT | Mod: RT

## 2024-08-21 RX ORDER — MELOXICAM 15 MG/1
15 TABLET ORAL
Qty: 30 | Refills: 1 | Status: ACTIVE | COMMUNITY
Start: 2024-08-21 | End: 1900-01-01

## 2024-08-21 NOTE — HISTORY OF PRESENT ILLNESS
[10] : 10 [Sharp] : sharp [Stabbing] : stabbing [de-identified] : 3/27/24: pt is here today for rt knee pain. Did PT for the knee with good relief. Has had gel and CSI in the past with no relief. States PT has helped.  Had arthroscopic surgery in 2020 08/21/24: pt is here today for f/u on rt knee. pt states pain is continuing. still the same as last visit. pt has not been attending physical therapy until today.

## 2024-10-02 ENCOUNTER — APPOINTMENT (OUTPATIENT)
Dept: ORTHOPEDIC SURGERY | Facility: CLINIC | Age: 39
End: 2024-10-02

## 2024-10-08 ENCOUNTER — APPOINTMENT (OUTPATIENT)
Dept: PAIN MANAGEMENT | Facility: CLINIC | Age: 39
End: 2024-10-08

## 2024-10-10 ENCOUNTER — APPOINTMENT (OUTPATIENT)
Dept: PAIN MANAGEMENT | Facility: CLINIC | Age: 39
End: 2024-10-10
Payer: MEDICAID

## 2024-10-10 DIAGNOSIS — M54.17 RADICULOPATHY, LUMBOSACRAL REGION: ICD-10-CM

## 2024-10-10 PROCEDURE — J3490M: CUSTOM

## 2024-10-10 PROCEDURE — 64636Z: CUSTOM | Mod: 50,59

## 2024-10-10 PROCEDURE — 64635 DESTROY LUMB/SAC FACET JNT: CPT | Mod: 50

## 2024-10-11 ENCOUNTER — RX RENEWAL (OUTPATIENT)
Age: 39
End: 2024-10-11

## 2024-10-21 ENCOUNTER — APPOINTMENT (OUTPATIENT)
Dept: PAIN MANAGEMENT | Facility: CLINIC | Age: 39
End: 2024-10-21

## 2024-11-18 ENCOUNTER — APPOINTMENT (OUTPATIENT)
Dept: PAIN MANAGEMENT | Facility: CLINIC | Age: 39
End: 2024-11-18
Payer: MEDICAID

## 2024-11-18 VITALS — HEIGHT: 61 IN | BODY MASS INDEX: 26.06 KG/M2 | WEIGHT: 138 LBS

## 2024-11-18 DIAGNOSIS — M47.816 SPONDYLOSIS W/OUT MYELOPATHY OR RADICULOPATHY, LUMBAR REGION: ICD-10-CM

## 2024-11-18 DIAGNOSIS — M54.50 LOW BACK PAIN, UNSPECIFIED: ICD-10-CM

## 2024-11-18 PROCEDURE — 99214 OFFICE O/P EST MOD 30 MIN: CPT

## 2024-11-20 ENCOUNTER — APPOINTMENT (OUTPATIENT)
Dept: ORTHOPEDIC SURGERY | Facility: CLINIC | Age: 39
End: 2024-11-20
Payer: MEDICAID

## 2024-11-20 DIAGNOSIS — M17.11 UNILATERAL PRIMARY OSTEOARTHRITIS, RIGHT KNEE: ICD-10-CM

## 2024-11-20 PROCEDURE — 99214 OFFICE O/P EST MOD 30 MIN: CPT | Mod: 25

## 2024-11-20 PROCEDURE — 20610 DRAIN/INJ JOINT/BURSA W/O US: CPT | Mod: RT

## 2024-11-20 RX ORDER — CELECOXIB 200 MG/1
200 CAPSULE ORAL
Qty: 60 | Refills: 0 | Status: ACTIVE | COMMUNITY
Start: 2024-11-20 | End: 1900-01-01

## 2025-01-09 ENCOUNTER — APPOINTMENT (OUTPATIENT)
Dept: PAIN MANAGEMENT | Facility: CLINIC | Age: 40
End: 2025-01-09
Payer: MEDICAID

## 2025-01-09 DIAGNOSIS — M47.816 SPONDYLOSIS W/OUT MYELOPATHY OR RADICULOPATHY, LUMBAR REGION: ICD-10-CM

## 2025-01-09 PROCEDURE — 64493 INJ PARAVERT F JNT L/S 1 LEV: CPT | Mod: 50

## 2025-01-09 PROCEDURE — 64494 INJ PARAVERT F JNT L/S 2 LEV: CPT | Mod: 50,59

## 2025-01-27 ENCOUNTER — APPOINTMENT (OUTPATIENT)
Dept: PAIN MANAGEMENT | Facility: CLINIC | Age: 40
End: 2025-01-27
Payer: MEDICAID

## 2025-01-27 VITALS — BODY MASS INDEX: 26.24 KG/M2 | HEIGHT: 61 IN | WEIGHT: 139 LBS

## 2025-01-27 DIAGNOSIS — M54.17 RADICULOPATHY, LUMBOSACRAL REGION: ICD-10-CM

## 2025-01-27 DIAGNOSIS — M54.50 LOW BACK PAIN, UNSPECIFIED: ICD-10-CM

## 2025-01-27 PROCEDURE — 99214 OFFICE O/P EST MOD 30 MIN: CPT

## 2025-01-30 RX ORDER — TIZANIDINE 4 MG/1
4 TABLET ORAL
Qty: 60 | Refills: 2 | Status: ACTIVE | COMMUNITY
Start: 2025-01-30 | End: 1900-01-01

## 2025-02-05 ENCOUNTER — APPOINTMENT (OUTPATIENT)
Dept: ORTHOPEDIC SURGERY | Facility: CLINIC | Age: 40
End: 2025-02-05

## 2025-02-11 ENCOUNTER — RESULT REVIEW (OUTPATIENT)
Age: 40
End: 2025-02-11

## 2025-02-18 ENCOUNTER — APPOINTMENT (OUTPATIENT)
Dept: PAIN MANAGEMENT | Facility: CLINIC | Age: 40
End: 2025-02-18

## 2025-02-20 ENCOUNTER — APPOINTMENT (OUTPATIENT)
Dept: PAIN MANAGEMENT | Facility: CLINIC | Age: 40
End: 2025-02-20
Payer: MEDICAID

## 2025-02-20 DIAGNOSIS — M54.17 RADICULOPATHY, LUMBOSACRAL REGION: ICD-10-CM

## 2025-02-20 PROCEDURE — 62323 NJX INTERLAMINAR LMBR/SAC: CPT

## 2025-02-26 ENCOUNTER — APPOINTMENT (OUTPATIENT)
Dept: ORTHOPEDIC SURGERY | Facility: CLINIC | Age: 40
End: 2025-02-26
Payer: MEDICAID

## 2025-02-26 DIAGNOSIS — M17.11 UNILATERAL PRIMARY OSTEOARTHRITIS, RIGHT KNEE: ICD-10-CM

## 2025-02-26 PROCEDURE — 99214 OFFICE O/P EST MOD 30 MIN: CPT | Mod: 25

## 2025-02-26 PROCEDURE — 20610 DRAIN/INJ JOINT/BURSA W/O US: CPT | Mod: RT

## 2025-02-26 RX ORDER — DICLOFENAC SODIUM 75 MG/1
75 TABLET, DELAYED RELEASE ORAL
Qty: 60 | Refills: 0 | Status: ACTIVE | COMMUNITY
Start: 2025-02-26 | End: 1900-01-01

## 2025-03-03 ENCOUNTER — APPOINTMENT (OUTPATIENT)
Dept: PAIN MANAGEMENT | Facility: CLINIC | Age: 40
End: 2025-03-03
Payer: MEDICAID

## 2025-03-03 VITALS — BODY MASS INDEX: 24.55 KG/M2 | WEIGHT: 130 LBS | HEIGHT: 61 IN

## 2025-03-03 DIAGNOSIS — M54.50 LOW BACK PAIN, UNSPECIFIED: ICD-10-CM

## 2025-03-03 DIAGNOSIS — M54.17 RADICULOPATHY, LUMBOSACRAL REGION: ICD-10-CM

## 2025-03-03 PROCEDURE — 99214 OFFICE O/P EST MOD 30 MIN: CPT

## 2025-04-02 ENCOUNTER — APPOINTMENT (OUTPATIENT)
Dept: ORTHOPEDIC SURGERY | Facility: CLINIC | Age: 40
End: 2025-04-02
Payer: MEDICAID

## 2025-04-02 DIAGNOSIS — M17.11 UNILATERAL PRIMARY OSTEOARTHRITIS, RIGHT KNEE: ICD-10-CM

## 2025-04-02 PROCEDURE — 99214 OFFICE O/P EST MOD 30 MIN: CPT

## 2025-04-07 RX ORDER — DICLOFENAC SODIUM 75 MG/1
75 TABLET, DELAYED RELEASE ORAL
Qty: 60 | Refills: 1 | Status: ACTIVE | COMMUNITY
Start: 2025-04-07 | End: 1900-01-01

## 2025-04-17 ENCOUNTER — APPOINTMENT (OUTPATIENT)
Dept: PAIN MANAGEMENT | Facility: CLINIC | Age: 40
End: 2025-04-17
Payer: MEDICAID

## 2025-04-17 DIAGNOSIS — M47.816 SPONDYLOSIS W/OUT MYELOPATHY OR RADICULOPATHY, LUMBAR REGION: ICD-10-CM

## 2025-04-17 DIAGNOSIS — M54.17 RADICULOPATHY, LUMBOSACRAL REGION: ICD-10-CM

## 2025-04-17 PROCEDURE — J3490M: CUSTOM

## 2025-04-17 PROCEDURE — 64493 INJ PARAVERT F JNT L/S 1 LEV: CPT | Mod: 50

## 2025-04-17 PROCEDURE — 64494 INJ PARAVERT F JNT L/S 2 LEV: CPT | Mod: 50,59

## 2025-05-05 ENCOUNTER — APPOINTMENT (OUTPATIENT)
Dept: PAIN MANAGEMENT | Facility: CLINIC | Age: 40
End: 2025-05-05
Payer: MEDICAID

## 2025-05-05 VITALS — WEIGHT: 128 LBS | HEIGHT: 61 IN | BODY MASS INDEX: 24.17 KG/M2

## 2025-05-05 DIAGNOSIS — M47.816 SPONDYLOSIS W/OUT MYELOPATHY OR RADICULOPATHY, LUMBAR REGION: ICD-10-CM

## 2025-05-05 DIAGNOSIS — M54.50 LOW BACK PAIN, UNSPECIFIED: ICD-10-CM

## 2025-05-05 PROCEDURE — 99214 OFFICE O/P EST MOD 30 MIN: CPT

## 2025-05-05 RX ORDER — METHOCARBAMOL 750 MG/1
750 TABLET, FILM COATED ORAL TWICE DAILY
Qty: 60 | Refills: 0 | Status: ACTIVE | COMMUNITY
Start: 2025-05-05 | End: 1900-01-01

## 2025-05-21 ENCOUNTER — APPOINTMENT (OUTPATIENT)
Dept: ORTHOPEDIC SURGERY | Facility: CLINIC | Age: 40
End: 2025-05-21
Payer: MEDICAID

## 2025-05-21 DIAGNOSIS — M17.11 UNILATERAL PRIMARY OSTEOARTHRITIS, RIGHT KNEE: ICD-10-CM

## 2025-05-21 PROCEDURE — 99214 OFFICE O/P EST MOD 30 MIN: CPT | Mod: 25

## 2025-05-21 PROCEDURE — 20610 DRAIN/INJ JOINT/BURSA W/O US: CPT | Mod: RT

## 2025-05-21 RX ORDER — MELOXICAM 15 MG/1
15 TABLET ORAL
Qty: 20 | Refills: 0 | Status: ACTIVE | COMMUNITY
Start: 2025-05-21 | End: 1900-01-01

## 2025-05-27 ENCOUNTER — RX RENEWAL (OUTPATIENT)
Age: 40
End: 2025-05-27

## 2025-06-13 ENCOUNTER — RX RENEWAL (OUTPATIENT)
Age: 40
End: 2025-06-13

## 2025-06-23 ENCOUNTER — RX RENEWAL (OUTPATIENT)
Age: 40
End: 2025-06-23

## 2025-07-17 ENCOUNTER — APPOINTMENT (OUTPATIENT)
Dept: PAIN MANAGEMENT | Facility: CLINIC | Age: 40
End: 2025-07-17
Payer: MEDICAID

## 2025-07-17 PROCEDURE — J3490M: CUSTOM

## 2025-07-17 PROCEDURE — 64494 INJ PARAVERT F JNT L/S 2 LEV: CPT | Mod: 50,59

## 2025-07-17 PROCEDURE — 64493 INJ PARAVERT F JNT L/S 1 LEV: CPT | Mod: 50

## 2025-08-08 RX ORDER — MELOXICAM 15 MG/1
15 TABLET ORAL
Qty: 30 | Refills: 1 | Status: ACTIVE | COMMUNITY
Start: 2025-08-08 | End: 1900-01-01

## 2025-08-11 ENCOUNTER — APPOINTMENT (OUTPATIENT)
Dept: PAIN MANAGEMENT | Facility: CLINIC | Age: 40
End: 2025-08-11
Payer: MEDICAID

## 2025-08-11 VITALS — BODY MASS INDEX: 24.35 KG/M2 | WEIGHT: 129 LBS | HEIGHT: 61 IN

## 2025-08-11 DIAGNOSIS — M54.50 LOW BACK PAIN, UNSPECIFIED: ICD-10-CM

## 2025-08-11 DIAGNOSIS — M47.816 SPONDYLOSIS W/OUT MYELOPATHY OR RADICULOPATHY, LUMBAR REGION: ICD-10-CM

## 2025-08-11 PROCEDURE — 99214 OFFICE O/P EST MOD 30 MIN: CPT

## 2025-09-11 ENCOUNTER — APPOINTMENT (OUTPATIENT)
Dept: PAIN MANAGEMENT | Facility: CLINIC | Age: 40
End: 2025-09-11